# Patient Record
Sex: MALE | Race: WHITE | NOT HISPANIC OR LATINO | Employment: UNEMPLOYED | URBAN - METROPOLITAN AREA
[De-identification: names, ages, dates, MRNs, and addresses within clinical notes are randomized per-mention and may not be internally consistent; named-entity substitution may affect disease eponyms.]

---

## 2023-10-27 ENCOUNTER — LAB REQUISITION (OUTPATIENT)
Dept: LAB | Facility: HOSPITAL | Age: 23
End: 2023-10-27
Payer: COMMERCIAL

## 2023-10-27 DIAGNOSIS — Z20.2 CONTACT WITH AND (SUSPECTED) EXPOSURE TO INFECTIONS WITH A PREDOMINANTLY SEXUAL MODE OF TRANSMISSION: ICD-10-CM

## 2023-10-27 PROCEDURE — 87800 DETECT AGNT MULT DNA DIREC: CPT

## 2023-10-28 LAB
C TRACH RRNA SPEC QL NAA+PROBE: NEGATIVE
N GONORRHOEA DNA SPEC QL PROBE+SIG AMP: NEGATIVE

## 2024-10-01 ENCOUNTER — OFFICE VISIT (OUTPATIENT)
Dept: URGENT CARE | Facility: URGENT CARE | Age: 24
End: 2024-10-01
Payer: COMMERCIAL

## 2024-10-01 VITALS
DIASTOLIC BLOOD PRESSURE: 87 MMHG | HEART RATE: 65 BPM | RESPIRATION RATE: 18 BRPM | SYSTOLIC BLOOD PRESSURE: 121 MMHG | BODY MASS INDEX: 21.77 KG/M2 | WEIGHT: 138.89 LBS | OXYGEN SATURATION: 99 % | TEMPERATURE: 97.5 F

## 2024-10-01 DIAGNOSIS — J06.9 ACUTE UPPER RESPIRATORY INFECTION: Primary | ICD-10-CM

## 2024-10-01 DIAGNOSIS — J34.89 SINUS DRAINAGE: ICD-10-CM

## 2024-10-01 DIAGNOSIS — J30.89 ALLERGIC RHINITIS DUE TO OTHER ALLERGIC TRIGGER, UNSPECIFIED SEASONALITY: ICD-10-CM

## 2024-10-01 LAB — POC SARS-COV-2 AG BINAX: NORMAL

## 2024-10-01 PROCEDURE — 87811 SARS-COV-2 COVID19 W/OPTIC: CPT | Performed by: FAMILY MEDICINE

## 2024-10-01 PROCEDURE — 99213 OFFICE O/P EST LOW 20 MIN: CPT | Performed by: FAMILY MEDICINE

## 2024-10-01 RX ORDER — LISDEXAMFETAMINE DIMESYLATE 50 MG/1
50 CAPSULE ORAL DAILY
COMMUNITY

## 2024-10-01 RX ORDER — CLONAZEPAM 1 MG/1
1 TABLET ORAL EVERY 12 HOURS
COMMUNITY
Start: 2024-09-23 | End: 2025-09-01

## 2024-10-01 RX ORDER — SPIRONOLACTONE 50 MG/1
1 TABLET, FILM COATED ORAL
COMMUNITY
Start: 2024-08-27

## 2024-10-01 RX ORDER — ESTRADIOL 1 MG/1
1 TABLET ORAL
COMMUNITY
Start: 2024-08-27

## 2024-10-01 RX ORDER — GABAPENTIN 300 MG/1
300 CAPSULE ORAL 3 TIMES DAILY
COMMUNITY
Start: 2024-09-23 | End: 2025-09-23

## 2024-10-01 ASSESSMENT — ENCOUNTER SYMPTOMS
ENDOCRINE NEGATIVE: 1
FEVER: 1
NERVOUS/ANXIOUS: 0
SINUS PAIN: 0
SINUS COMPLAINT: 1
DIZZINESS: 0
SHORTNESS OF BREATH: 0
SEIZURES: 0
PSYCHIATRIC NEGATIVE: 1
SORE THROAT: 1
GASTROINTESTINAL NEGATIVE: 1
ABDOMINAL PAIN: 0
DIARRHEA: 0
DYSURIA: 0
NECK PAIN: 0
WHEEZING: 0
EYES NEGATIVE: 1
SINUS PRESSURE: 0
CONSTIPATION: 0
BACK PAIN: 0
AGITATION: 0
HEMATURIA: 0
ALLERGIC/IMMUNOLOGIC NEGATIVE: 1
CHEST TIGHTNESS: 0
HEADACHES: 1
COUGH: 1

## 2024-10-01 NOTE — PATIENT INSTRUCTIONS
COVID - Negative    Upper Respiratory Infection Treatment includes multiple support treatments:  1) Fluids are essential to assist body's ability to heal itself.  This has been shown to shorten any infection.  - Humidifier/Vaporizer - can be helpful is air is dry, since the air will take fluid away from the body  - Drink Fluids - a lot of them.  Water is best, but can flavor it.  Also water down juice/sport drinks or teas.   When hungry - eat foods that have a lot of fluid and avoid most of the dry or greasy meals.     - Soups, yogurts, fruits and other fluid giving foods can be helpful.   - Eat small amounts frequently when hungry rather than larger amounts, since gut may be tender after being sick    2) Inflammation:  - Ibuprofen (Advil,Motrin) - for inflammation, congestion, headache, body aches.     - Do NOT use if using a blood thinner, have Stomach ulcer or Kidney disease    (May use with Mucinex brand as long as the active ingredients don't have ibuprofen listed.)  - Allergic inflammation (any drainage or congesiton from irritants or allergens)    - May be treated with cetirizine (Zyrtec) or like once a day non drowsy medication.      - Start or keep taking while having viral illness, since it may not be the main cause, this inflammation can contribute and make the symptoms worse.  - Acetaminophen (Tylenol) - for congestion, headache and body aches.      (Often already in multisymptom medications, so check active ingredients on OTC medications before adding more)    3) Drainage/Phlegm:  - Guaifenesin (Mucinex, Robitussin) - for congestion, mucous, cough  - Vicks vapor rub - may be use on feet or chest, cover so child doesn't eat the ointment.    (May use in Vaporizer/humidifier - if design allows)  - Nasal saline to rinse the nose - spray or rinses.    4) Restore/Maintain gut and body's balance  - Probiotic - eating yogurt and/or taking a supplement can help the body tolerate the medication and regain balance  while and after being sick

## 2024-10-01 NOTE — PROGRESS NOTES
"Subjective   Patient ID: Bi Valderrama is a 24 y.o. male. They present today with a chief complaint of Sinus Problem, Fever, and Headache (Sinus issues x 2 weeks, body aches, fatigue and sneezing x 1 day).    History of Present Illness  Bi Valderrama is a 24 y.o. male who presents to the  for a concern of congestion x 2 weeks with progression to fever and body aches last night.  He has had contact with a sick family member, who he states was in the ER with \"flu.\"  After further discussion, he isn't sure if they tested for influenza.  He has used Nyquil last night, which he doesn't like since it gives him nightmares.  He has been using Benadryl daily for the past 2 weeks for the congestion.  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  He denies current rash or gut symptoms.  He admits headache and body aches.        Sinus Problem  Associated symptoms: congestion, cough, fever, headaches and sore throat    Associated symptoms: no abdominal pain, no diarrhea, no ear pain, no rash, no shortness of breath and no wheezing    Fever   Associated symptoms include congestion, coughing, headaches and a sore throat. Pertinent negatives include no abdominal pain, diarrhea, ear pain, rash, urinary pain or wheezing.   Headache  Associated symptoms: congestion, cough, fever and sore throat    Associated symptoms: no abdominal pain, no back pain, no diarrhea, no dizziness, no ear pain, no neck pain, no seizures and no sinus pressure        Past Medical History  Allergies as of 10/01/2024    (No Known Allergies)       (Not in a hospital admission)       History reviewed. No pertinent past medical history.    Past Surgical History:   Procedure Laterality Date    OTHER SURGICAL HISTORY  01/10/2019    Tonsillectomy with adenoidectomy    OTHER SURGICAL HISTORY  01/10/2019    Ear pressure equalization tube insertion    OTHER SURGICAL " HISTORY  01/10/2019    Oral surgery        reports that he has been smoking cigarettes. He has never used smokeless tobacco.    Review of Systems  Review of Systems   Constitutional:  Positive for fever.   HENT:  Positive for congestion and sore throat. Negative for ear pain, sinus pressure and sinus pain.    Eyes: Negative.    Respiratory:  Positive for cough. Negative for chest tightness, shortness of breath and wheezing.    Gastrointestinal: Negative.  Negative for abdominal pain, constipation and diarrhea.   Endocrine: Negative.    Genitourinary: Negative.  Negative for dysuria and hematuria.   Musculoskeletal:  Negative for back pain and neck pain.   Skin: Negative.  Negative for rash.   Allergic/Immunologic: Negative.    Neurological:  Positive for headaches. Negative for dizziness, seizures and syncope.   Psychiatric/Behavioral: Negative.  Negative for agitation. The patient is not nervous/anxious.      Objective    Vitals:    10/01/24 0957   BP: 121/87   Pulse: 65   Resp: 18   Temp: 36.4 °C (97.5 °F)   SpO2: 99%   Weight: 63 kg (138 lb 14.2 oz)     No LMP for male patient.    Physical Exam  Vitals and nursing note reviewed.   Constitutional:       Appearance: Normal appearance. He is ill-appearing.   HENT:      Head: Normocephalic and atraumatic.      Right Ear: Tympanic membrane, ear canal and external ear normal. There is no impacted cerumen.      Left Ear: Tympanic membrane, ear canal and external ear normal. There is no impacted cerumen.      Ears:      Comments: Effusion B/L without purulence     Nose: Congestion and rhinorrhea present.      Comments: Mucosal edema     Mouth/Throat:      Mouth: Mucous membranes are moist.      Pharynx: No oropharyngeal exudate or posterior oropharyngeal erythema.      Comments: Cobblestoning  Eyes:      Extraocular Movements: Extraocular movements intact.      Pupils: Pupils are equal, round, and reactive to light.   Cardiovascular:      Rate and Rhythm: Normal rate and  "regular rhythm.   Pulmonary:      Effort: Pulmonary effort is normal.      Breath sounds: Normal breath sounds.   Abdominal:      General: Abdomen is flat.      Palpations: Abdomen is soft.   Musculoskeletal:         General: Normal range of motion.      Cervical back: Normal range of motion and neck supple.   Lymphadenopathy:      Cervical: No cervical adenopathy.   Skin:     General: Skin is warm and dry.   Neurological:      General: No focal deficit present.      Mental Status: He is alert and oriented to person, place, and time.   Psychiatric:      Comments: Passive Aggressive behavior with circular speech       Procedures    Point of Care Test & Imaging Results from this visit  Results for orders placed or performed in visit on 10/01/24   POCT Covid-19 Rapid Antigen   Result Value Ref Range    POC [place]-COV-2 AG  Presumptive negative test for SARS-CoV-2 (no antigen detected)     Presumptive negative test for SARS-CoV-2 (no antigen detected)      No results found.    Diagnostic study results (if any) were reviewed by Angélica Perez DO.    Assessment/Plan   Allergies, medications, history, and pertinent labs/EKGs/Imaging reviewed by Angélica Perez DO.     Orders and Diagnoses  Diagnoses and all orders for this visit:  Acute upper respiratory infection  Allergic rhinitis due to other allergic trigger, unspecified seasonality  Sinus drainage  -     POCT Covid-19 Rapid Antigen    Attempts to see remnants of rash on arms were unsuccessful, since he only showed a quick peek at arms.  When I attempted to explain that oil from plants cause dermatitis like poison ivy, he became upset and said he wasn't here for the rash.  He repeated that he doesn't get poison ivy - \"I can rub it all over.\"  Attempts to educate him on chemical dermatitis was abandoned at this time.    I explained that the physical exam and his history show baseline allergies with an acute viral illness.  He becomes upset, states \"I know that.  I wrote " "viral illness on the intake.\"      I moved our discussion to treatment, and I offered to send cetirizine and guaifenesin.    He said had Zyrtec and wanted to know how the Robitussin tasted.  I explained that I would send a pill, so that wouldn't be an issue.      He restarted the story about being ill last night, and said it was his step-mom's fault.  I tried to get him back on track when he became angry and asked how I was going to treat him since he's trying to get \"ahead\" of it.  I reviewed what I was advising, and he stood to leave.  \"This is what our tax dollars are paying for?\"     Patient left before discharge paperwork could be printed and final instructions given.    Patient became upset and frustrated, disrespectful and walked out as I was repeating that his symptoms are viral with baseline allergies.   He continued to be disrespectful the entire walk out the clinic stating that we didn't want to help him, he couldn't believe how we treated him, and used profanity to describe his opinion of me as a provider.    No treatment was prescribed, and the FLU swab was not done due to him leaving before I could finish the visit.      Patient disposition: Home    Electronically signed by Angélica Perez, DO  10:10 AM      "